# Patient Record
Sex: FEMALE | Race: BLACK OR AFRICAN AMERICAN | NOT HISPANIC OR LATINO | Employment: FULL TIME | URBAN - METROPOLITAN AREA
[De-identification: names, ages, dates, MRNs, and addresses within clinical notes are randomized per-mention and may not be internally consistent; named-entity substitution may affect disease eponyms.]

---

## 2018-06-20 ENCOUNTER — INITIAL CONSULT (OUTPATIENT)
Dept: GYNECOLOGIC ONCOLOGY | Facility: CLINIC | Age: 53
End: 2018-06-20
Payer: COMMERCIAL

## 2018-06-20 VITALS
BODY MASS INDEX: 35.47 KG/M2 | SYSTOLIC BLOOD PRESSURE: 135 MMHG | HEART RATE: 68 BPM | DIASTOLIC BLOOD PRESSURE: 76 MMHG | HEIGHT: 67 IN | WEIGHT: 226 LBS

## 2018-06-20 PROCEDURE — 99244 OFF/OP CNSLTJ NEW/EST MOD 40: CPT | Mod: S$GLB,,, | Performed by: OBSTETRICS & GYNECOLOGY

## 2018-06-20 PROCEDURE — 99999 PR PBB SHADOW E&M-EST. PATIENT-LVL III: CPT | Mod: PBBFAC,,, | Performed by: OBSTETRICS & GYNECOLOGY

## 2018-06-20 RX ORDER — ESTRADIOL 0.05 MG/D
FILM, EXTENDED RELEASE TRANSDERMAL
COMMUNITY

## 2018-06-20 RX ORDER — ACETAMINOPHEN AND CODEINE PHOSPHATE 300; 30 MG/1; MG/1
TABLET ORAL
COMMUNITY
End: 2018-06-20

## 2018-06-20 RX ORDER — HYDROCODONE BITARTRATE AND ACETAMINOPHEN 10; 325 MG/1; MG/1
TABLET ORAL
COMMUNITY
End: 2018-06-20

## 2018-06-20 RX ORDER — HYOSCYAMINE SULFATE 0.12 MG/1
TABLET SUBLINGUAL
COMMUNITY
End: 2018-06-20

## 2018-06-20 RX ORDER — OXYCODONE AND ACETAMINOPHEN 10; 325 MG/1; MG/1
TABLET ORAL
COMMUNITY
End: 2018-06-20

## 2018-06-20 RX ORDER — NITROFURANTOIN (MACROCRYSTALS) 100 MG/1
CAPSULE ORAL
COMMUNITY
End: 2018-06-20

## 2018-06-20 RX ORDER — ESTRADIOL 0.1 MG/D
FILM, EXTENDED RELEASE TRANSDERMAL
COMMUNITY

## 2018-06-20 RX ORDER — AMITRIPTYLINE HYDROCHLORIDE 10 MG/1
TABLET, FILM COATED ORAL
COMMUNITY

## 2018-06-20 RX ORDER — CIPROFLOXACIN 500 MG/1
TABLET ORAL
COMMUNITY
End: 2018-06-20

## 2018-06-20 RX ORDER — SOLIFENACIN SUCCINATE 5 MG/1
TABLET, FILM COATED ORAL
COMMUNITY
End: 2018-06-20

## 2018-06-20 NOTE — LETTER
June 24, 2018      Alisia Oliver MD  [address]  [address]  [facility]  [place] [address]           [facility]  [address], [address]  [facility] [address]  Phone: [phone]  Fax: [phone]          Patient: Shy Snell   MR Number: [id number]   YOB: 1965   Date of Visit: 6/20/2018       Dear Dr. Alisia Oliver:    Thank you for referring Shy Snell to me for evaluation. Attached you will find relevant portions of my assessment and plan of care.    If you have questions, please do not hesitate to call me. I look forward to following Shy Snell along with you.    Sincerely,    Adeline Lima MD    Enclosure  CC:  No Recipients    If you would like to receive this communication electronically, please contact [email or web address] or [phone] to request more information on [employer] access.    For providers and/or their staff who would like to refer a patient to [facility], please contact us through our one-stop-shop provider referral line, [facility], at [phone].    If you feel you have received this communication in error or would no longer like to receive these types of communications, please e-mail [email or web address]

## 2018-06-24 NOTE — PROGRESS NOTES
Subjective:      Patient ID: Shy Snell is a 53 y.o. female.    Chief Complaint: Ovarian Cyst (Bilateral ovarian cyst/consult )      HPI  Referred by Dr. Alisia Oliver for bilateral ovarian cysts.     Symptoms include lower back pain and pelvic pain, she works at a [employer] and symptoms are worse with prolonged standing and reaching.      normal 5.8. Estradiol 18 and FSH 56.8 (menopausal range)    CT A&P 5/30/18 shows 3.4cm cyst in the left pelvis and 3cm cystic collection on the right.     Prior abdominal surgical history is complex with documented severe adhesive disease. Includes open myomectomy x 2, MARIVEL (possible removal of one ovary?) with subsequent re-exploration for infected hematoma, and xlap/cystectomy? With bladder injury. Last surgery 2 years ago.   Review of Systems   Constitutional: Negative for appetite change, chills, fatigue and fever.   HENT: Negative for mouth sores.    Respiratory: Negative for cough and shortness of breath.    Cardiovascular: Negative for leg swelling.   Gastrointestinal: Negative for abdominal pain, blood in stool, constipation and diarrhea.   Endocrine: Negative for cold intolerance.   Genitourinary: Positive for pelvic pain. Negative for dysuria and vaginal bleeding.   Musculoskeletal: Positive for back pain. Negative for myalgias.   Skin: Negative for rash.   Allergic/Immunologic: Negative.    Neurological: Negative for weakness and numbness.   Hematological: Negative for adenopathy. Does not bruise/bleed easily.   Psychiatric/Behavioral: Negative for confusion.       Past Medical History:   Diagnosis Date    Abdominal cyst 6/24/2018    Ovarian cyst      Past Surgical History:   Procedure Laterality Date    fibriod removal      x3    OVARIAN CYST SURGERY       Family History   Problem Relation Age of Onset    Hypertension Mother     Hypertension Father      Social History     Social History    Marital status:      Spouse name: N/A    Number of  children: N/A    Years of education: N/A     Occupational History    Not on file.     Social History Main Topics    Smoking status: Never Smoker    Smokeless tobacco: Never Used    Alcohol use Yes      Comment: occass    Drug use: No    Sexual activity: Not on file     Other Topics Concern    Not on file     Social History Narrative    No narrative on file     Current Outpatient Prescriptions   Medication Sig    estradiol (MINIVELLE) 0.1 mg/24 hr PTSW Minivelle 0.1 mg/24 hr transdermal patch    estradiol 0.05 mg/24 hr td ptsw (MINIVELLE) 0.05 mg/24 hr Minivelle 0.05 mg/24 hr transdermal patch   Apply 1 patch twice a week by transdermal route as directed for 30 days.    ibuprofen (ADVIL,MOTRIN) 200 MG tablet Take 600 mg by mouth every 6 (six) hours as needed for Pain.    amitriptyline (ELAVIL) 10 MG tablet amitriptyline 10 mg tablet     No current facility-administered medications for this visit.      Review of patient's allergies indicates:  No Known Allergies    Objective:   Physical Exam:   Constitutional: She is oriented to person, place, and time. She appears well-developed and well-nourished.    HENT:   Head: Normocephalic and atraumatic.    Eyes: EOM are normal. Pupils are equal, round, and reactive to light.    Neck: Normal range of motion. Neck supple. No thyromegaly present.    Cardiovascular: Normal rate, regular rhythm and intact distal pulses.     Pulmonary/Chest: Effort normal and breath sounds normal. No respiratory distress. She has no wheezes.        Abdominal: Soft. Bowel sounds are normal. She exhibits no distension, no ascites and no mass. There is no tenderness.     Genitourinary: Rectum normal and vagina normal. Pelvic exam was performed with patient supine. There is no lesion on the right labia. There is no lesion on the left labia. Uterus is absent. Right adnexum displays no mass and no fullness. Left adnexum displays no mass and no fullness. Vaginal cuff normal.Cervix exhibits  absence.   Genitourinary Comments: Cystic structure evident on CT are palpable on exam.            Musculoskeletal: Normal range of motion and moves all extremeties.      Lymphadenopathy:     She has no cervical adenopathy.        Right: No inguinal and no supraclavicular adenopathy present.        Left: No inguinal and no supraclavicular adenopathy present.    Neurological: She is alert and oriented to person, place, and time.    Skin: Skin is warm and dry. No rash noted.    Psychiatric: She has a normal mood and affect.       Assessment:     1. Abdominal cyst        Plan:   No orders of the defined types were placed in this encounter.      CT reviewed with patient. Normal . Long discussion regarding R/B/A to surgical exploration. Cystic collections could represent ovarian remnant vs peritoneal inclusion cyst. Pain likely multifactorial including severe adhesive disease. Given the size of the lesions, not discretely palpable on exam, normal  I have recommended against surgical re-exploration at this time given high risk of surgical morbidity and exacerbation of adhesive disease. Will plan for follow up in 3 months to assess how she is doing.

## 2018-09-18 ENCOUNTER — TELEPHONE (OUTPATIENT)
Dept: GYNECOLOGIC ONCOLOGY | Facility: CLINIC | Age: 53
End: 2018-09-18